# Patient Record
Sex: MALE | Race: AMERICAN INDIAN OR ALASKA NATIVE
[De-identification: names, ages, dates, MRNs, and addresses within clinical notes are randomized per-mention and may not be internally consistent; named-entity substitution may affect disease eponyms.]

---

## 2022-06-13 ENCOUNTER — HOSPITAL ENCOUNTER (EMERGENCY)
Dept: HOSPITAL 5 - ED | Age: 22
Discharge: HOME | End: 2022-06-13
Payer: MEDICAID

## 2022-06-13 VITALS — DIASTOLIC BLOOD PRESSURE: 78 MMHG | SYSTOLIC BLOOD PRESSURE: 132 MMHG

## 2022-06-13 DIAGNOSIS — H61.21: Primary | ICD-10-CM

## 2022-06-13 PROCEDURE — 99282 EMERGENCY DEPT VISIT SF MDM: CPT

## 2022-06-13 NOTE — EMERGENCY DEPARTMENT REPORT
ED ENT HPI





- General


Chief complaint: Earache


Stated complaint: UNABLE TO HEAR OUT OF RT EAR


Time Seen by Provider: 06/13/22 07:42


Source: patient


Mode of arrival: Ambulatory


Limitations: No Limitations





- History of Present Illness


Initial comments: 





22-year-old black male with no past medical history presents to the emergency 

department for evaluation of muffled hearing to the right ear since yesterday.  

He states that hearing was worse when he woke up this morning.  He denies any 

pain to his ear, cough, congestion, drainage from ear, or fever.


MD complaint: other (Muffled hearing to the right ear only)


-: days(s) (1)


Location: R ear


Severity scale (0 -10): 0


Associated Symptoms: hearing loss.  denies: fever, cough, discharge from ear, 

rhinorrhea





- Related Data


                                  Previous Rx's











 Medication  Instructions  Recorded  Last Taken  Type


 


Neomy/Polymyx B/Hc (Otic) Soln 4 drops RTEAR TID #1 bottle 06/13/22 Unknown Rx





[Cortisporin (Otic) Soln]    











                                    Allergies











Allergy/AdvReac Type Severity Reaction Status Date / Time


 


No Known Allergies Allergy   Verified 06/13/22 07:17














ED Dental HPI





- General


Chief complaint: Earache


Stated complaint: UNABLE TO HEAR OUT OF RT EAR


Time Seen by Provider: 06/13/22 07:42


Source: patient


Mode of arrival: Ambulatory


Limitations: No Limitations





- Related Data


                                  Previous Rx's











 Medication  Instructions  Recorded  Last Taken  Type


 


Neomy/Polymyx B/Hc (Otic) Soln 4 drops RTEAR TID #1 bottle 06/13/22 Unknown Rx





[Cortisporin (Otic) Soln]    











                                    Allergies











Allergy/AdvReac Type Severity Reaction Status Date / Time


 


No Known Allergies Allergy   Verified 06/13/22 07:17














ED Review of Systems


ROS: 


Stated complaint: UNABLE TO HEAR OUT OF RT EAR


Other details as noted in HPI





Constitutional: denies: fever


Eyes: denies: eye pain, eye discharge


ENT: hearing loss.  denies: ear pain, throat pain, dental pain, congestion


Respiratory: denies: cough, shortness of breath


Cardiovascular: denies: chest pain


Gastrointestinal: denies: nausea, vomiting


Neurological: denies: headache





ED Past Medical Hx





- Surgical History


Additional Surgical History: tonsilectomy





- Social History


Smoking Status: Never Smoker


Substance Use Type: None





- Medications


Home Medications: 


                                Home Medications











 Medication  Instructions  Recorded  Confirmed  Last Taken  Type


 


Neomy/Polymyx B/Hc (Otic) Soln 4 drops RTEAR TID #1 bottle 06/13/22  Unknown Rx





[Cortisporin (Otic) Soln]     














ED Physical Exam





- General


Limitations: No Limitations


General appearance: alert, in no apparent distress





- Head


Head exam: Present: atraumatic, normocephalic





- Eye


Eye exam: Present: normal appearance.  Absent: conjunctival injection





- Expanded ENT Exam


  ** Expanded


Ear exam: Present: normal external inspection.  Absent: auricular hematoma, 

auricular trauma


TM/Canal exam: Cerumen Impaction: Right TM, Left TM





- Neck


Neck exam: Present: normal inspection.  Absent: tenderness, lymphadenopathy





- Respiratory


Respiratory exam: Absent: respiratory distress





- Cardiovascular


Cardiovascular Exam: Present: regular rate





- GI/Abdominal


GI/Abdominal exam: Absent: distended





- Extremities Exam


Extremities exam: Present: normal inspection





- Back Exam


Back exam: Present: normal inspection





- Neurological Exam


Neurological exam: Present: alert, oriented X3





- Psychiatric


Psychiatric exam: Present: normal affect, normal mood





- Skin


Skin exam: Present: warm, dry, intact, normal color





ED Course


                                   Vital Signs











  06/13/22





  07:12


 


Temperature 98.9 F


 


Pulse Rate 77


 


Respiratory 12





Rate 


 


Blood Pressure 146/102


 


O2 Sat by Pulse 98





Oximetry 














- Ear Wax Removal


  ** Right Ear


Cerumenolytic Used: Other (Carbamide peroxide otic solution (debrox))


Ear Canal Irrigated by: other (NP)


Ear Canal Irrigated With: warm saline using syringe/angiocath


Results: Re-examined: some cerumen remains


TM Visible: TM(s) intact, normal appe


Ear Canal: other (Erythema noted)


Patient Tolerated Procedure: well


Complications: no problems





ED Medical Decision Making





- Medical Decision Making





22-year-old black male with no past medical history presents to the emergency 

department for evaluation of muffled hearing to the right ear since yesterday.  

He states that hearing was worse when he woke up this morning.  He denies any 

pain to his ear, cough, congestion, drainage from ear, or fever.





Patient noted to have cerumen impaction to bilateral ears, but only complains of

hearing loss to right ear.  Earwax removal to the right ear per my procedure 

note.  Patient noted to have most of wax removed from right ear after procedure,

but noted to have some erythema to the right ear canal.  Patient was encouraged 

to use Debrox to the left ear also when he is home and was given prescription 

for Cortisporin drops and advised to use only if he develops pain to the ears or

nose any discharge or fever.  He was advised to follow-up with ear nose and 

throat doctor if no improvement or worsening symptoms.  He verbalizes 

understanding of and agreement with plan of care.


Critical care attestation.: 


If time is entered above; I have spent that time in minutes in the direct care 

of this critically ill patient, excluding procedure time.








ED Disposition


Clinical Impression: 


 Impacted cerumen of right ear





Disposition: 01 HOME / SELF CARE / HOMELESS


Is pt being admited?: No


Does the pt Need Aspirin: No


Condition: Stable


Instructions:  Earwax Buildup, Adult, Ear Irrigation, Ear Drops, Adult, 

Easy-to-Read


Additional Instructions: 


Use carbamide drops in both ears to improve wax buildup.  Use Cortisporin drops 

if you develop irritation or pain after irrigating ears.  Follow-up with ear 

nose throat doctor if no improvement or worsening symptoms.  Return to the 

emergency department as needed.


Prescriptions: 


Neomy/Polymyx B/Hc (Otic) Soln [Cortisporin (Otic) Soln] 4 drops RTEAR TID #1 

bottle


Referrals: 


KARY MORALES MD [Staff Physician] - 3-5 Days


Forms:  Work/School Release Form(ED)


Time of Disposition: 08:54

## 2022-07-24 ENCOUNTER — HOSPITAL ENCOUNTER (EMERGENCY)
Dept: HOSPITAL 5 - ED | Age: 22
Discharge: HOME | End: 2022-07-24
Payer: MEDICAID

## 2022-07-24 VITALS — DIASTOLIC BLOOD PRESSURE: 67 MMHG | SYSTOLIC BLOOD PRESSURE: 117 MMHG

## 2022-07-24 DIAGNOSIS — Z98.890: ICD-10-CM

## 2022-07-24 DIAGNOSIS — R51.9: Primary | ICD-10-CM

## 2022-07-24 PROCEDURE — 99282 EMERGENCY DEPT VISIT SF MDM: CPT

## 2022-07-24 PROCEDURE — 96372 THER/PROPH/DIAG INJ SC/IM: CPT

## 2022-07-24 NOTE — EMERGENCY DEPARTMENT REPORT
ED Headache HPI





- General


Chief Complaint: Headache


Stated Complaint: MIGRAINE [id number]


Time Seen by Provider: 07/24/22 10:57





- History of Present Illness


Initial Comments: 





22-year-old black male with no past medical history presents to the emergency 

department for evaluation of 3-day history of headache.  He states that he has 

been taken Tylenol at home with a headache with some improvement but headache is

not totally gone.  He denies vision changes, dizziness, nausea, vomiting.  He 

states that headache at this time is 4 out of 10.


Quality: moderate


Head Injury Location: frontal


Associated Symptoms: denies: confusion, fatigue, facial pain, fever/chills, 

flushing, loss of consciousness, nausea/vomiting, nasal congestion, nasal 

drainage, numbness in legs/feet, rash, seizures, sinus infection, stiff neck, 

vision changes, weakness


Allergies/Adverse Reactions: 


Allergies





No Known Allergies Allergy (Verified 06/13/22 07:17)


   








Home Medications: 


Ambulatory Orders





Neomy/Polymyx B/Hc (Otic) Soln [Cortisporin (Otic) Soln] 4 drops RTEAR TID #1 

bottle 06/13/22 


Butalb/Acetaminophen/Caffeine [Fioricet -40 mg CAP] 1 cap PO Q6HR PRN #12 

cap 07/24/22 











ED Review of Systems


ROS: 


Stated complaint: MIGRAINE [id number]


Other details as noted in HPI





Comment: All other systems reviewed and negative


Constitutional: denies: chills, fever


Eyes: denies: eye pain, vision change


ENT: denies: ear pain, dental pain, congestion


Respiratory: denies: cough, shortness of breath


Cardiovascular: denies: chest pain, palpitations


Gastrointestinal: denies: abdominal pain, nausea, vomiting


Genitourinary: denies: urgency, dysuria


Musculoskeletal: denies: back pain


Skin: denies: rash, lesions


Neurological: headache.  denies: weakness, numbness, paresthesias, confusion, 

abnormal gait, vertigo





ED Past Medical Hx





- Past Medical History


Previous Medical History?: No





- Surgical History


Past Surgical History?: No


Additional Surgical History: tonsilectomy





- Social History


Smoking Status: Never Smoker





- Medications


Home Medications: 


                                Home Medications











 Medication  Instructions  Recorded  Confirmed  Last Taken  Type


 


Neomy/Polymyx B/Hc (Otic) Soln 4 drops RTEAR TID #1 bottle 06/13/22  Unknown Rx





[Cortisporin (Otic) Soln]     


 


Butalb/Acetaminophen/Caffeine 1 cap PO Q6HR PRN #12 cap 07/24/22  Unknown Rx





[Fioricet -40 mg CAP]     














ED Physical Exam





- General


Limitations: No Limitations


General appearance: alert, in no apparent distress





- Head


Head exam: Present: atraumatic, normocephalic





- Eye


Eye exam: Present: normal appearance.  Absent: scleral icterus, conjunctival 

injection, periorbital swelling, periorbital tenderness





- ENT


ENT exam: Present: normal exam





- Neck


Neck exam: Present: normal inspection.  Absent: tenderness, lymphadenopathy





- Respiratory


Respiratory exam: Present: normal lung sounds bilaterally.  Absent: respiratory 

distress, wheezes, rales, rhonchi, stridor, chest wall tenderness





- Cardiovascular


Cardiovascular Exam: Present: regular rate, normal heart sounds





- GI/Abdominal


GI/Abdominal exam: Present: soft, normal bowel sounds.  Absent: tenderness, 

guarding, rebound, rigid





- Extremities Exam


Extremities exam: Present: normal inspection, normal capillary refill





- Back Exam


Back exam: Present: normal inspection.  Absent: vertebral tenderness





- Neurological Exam


Neurological exam: Present: alert, oriented X3, CN II-XII intact, normal gait, 

reflexes normal.  Absent: motor sensory deficit





- Expanded Neurological Exam


  ** Expanded


Patient oriented to: Present: person, place, time


Speech: Present: fluid speech


Cranial nerves: EOM's Intact: Normal, Gag Reflex: Normal, Tongue Deviation: 

Normal, Facial Sensation: Normal


Cerebellar function: Romberg: Normal


Upper motor neuron: Pronator Drift: Normal


Sensory exam: Upper Extremity Light Touch: Normal, Upper Extremity Temperature: 

Normal, Lower Extremity Light Touch: Normal, Lower Extremity Temperature: Normal


Best Eye Response ([place]): (4) open spontaneously


Best Motor Response ([place]): (6) obeys commands


Best Verbal Response (Geovanna): (5) oriented


[place] Total: 15





- Psychiatric


Psychiatric exam: Present: normal affect, normal mood





- Skin


Skin exam: Present: warm, dry, intact, normal color





ED Course


                                   Vital Signs











  07/24/22





  10:17


 


Temperature 98.9 F


 


Pulse Rate 99 H


 


Respiratory 14





Rate 


 


Blood Pressure 150/90


 


O2 Sat by Pulse 97





Oximetry 














ED Medical Decision Making





- Medical Decision Making





22-year-old black male with no past medical history presents to the emergency 

department for evaluation of 3-day history of headache.  He states that he has 

been taken Tylenol at home with a headache with some improvement but headache is

not totally gone.  He denies vision changes, dizziness, nausea, vomiting.  He 

states that headache at this time is 4 out of 10.





Physical exam unremarkable.  Headache totally resolved after medication.  

Patient be discharged home with prescription for Fioricet and advised to follow-

up with his primary care provider if worsening symptoms.  He is advised to 

return to the emergency department for any concerning symptoms.  He verbalizes 

understanding of and agreement with plan of care.


Critical care attestation.: 


If time is entered above; I have spent that time in minutes in the direct care 

of this critically ill patient, excluding procedure time.








ED Disposition


Clinical Impression: 


Headache


Qualifiers:


 Headache type: unspecified Headache chronicity pattern: acute headache 

Intractability: not intractable Qualified Code(s): R51.9 - Headache, unspecified





Disposition: 01 HOME / SELF CARE / HOMELESS


Is pt being admited?: No


Does the pt Need Aspirin: No


Condition: Stable


Instructions:  General Headache Without Cause, Easy-to-Read


Additional Instructions: 


Take medications as prescribed.  Follow-up with your primary care provider if no

improvement or worsening symptoms.  Return to the emergency department as 

needed.


Prescriptions: 


Butalb/Acetaminophen/Caffeine [Fioricet -40 mg CAP] 1 cap PO Q6HR PRN #12 

cap


 PRN Reason: Headache


Referrals: 


DENA GREER MD [Staff Physician] - 3-5 Days


Time of Disposition: 11:57